# Patient Record
Sex: FEMALE | Race: WHITE | NOT HISPANIC OR LATINO | Employment: FULL TIME | URBAN - METROPOLITAN AREA
[De-identification: names, ages, dates, MRNs, and addresses within clinical notes are randomized per-mention and may not be internally consistent; named-entity substitution may affect disease eponyms.]

---

## 2023-10-27 PROBLEM — R31.9 HEMATURIA: Status: ACTIVE | Noted: 2023-10-27

## 2023-10-27 RX ORDER — ELECTROLYTES/DEXTROSE
SOLUTION, ORAL ORAL
COMMUNITY

## 2023-10-27 RX ORDER — DROSPIRENONE AND ETHINYL ESTRADIOL 0.02-3(28)
1 KIT ORAL DAILY
COMMUNITY
Start: 2020-03-31 | End: 2024-02-15

## 2023-10-27 RX ORDER — DICLOFENAC SODIUM 50 MG/1
TABLET, DELAYED RELEASE ORAL
COMMUNITY
End: 2024-01-11 | Stop reason: WASHOUT

## 2023-10-27 RX ORDER — MULTIVITAMIN/IRON/FOLIC ACID 18MG-0.4MG
TABLET ORAL
COMMUNITY

## 2023-10-30 ENCOUNTER — OFFICE VISIT (OUTPATIENT)
Dept: OBSTETRICS AND GYNECOLOGY | Facility: CLINIC | Age: 53
End: 2023-10-30
Payer: COMMERCIAL

## 2023-10-30 VITALS
BODY MASS INDEX: 22.4 KG/M2 | DIASTOLIC BLOOD PRESSURE: 80 MMHG | SYSTOLIC BLOOD PRESSURE: 128 MMHG | WEIGHT: 160 LBS | HEIGHT: 71 IN

## 2023-10-30 DIAGNOSIS — Z01.419 ENCOUNTER FOR GYNECOLOGICAL EXAMINATION WITHOUT ABNORMAL FINDING: ICD-10-CM

## 2023-10-30 DIAGNOSIS — Z01.419 WOMEN'S ANNUAL ROUTINE GYNECOLOGICAL EXAMINATION: Primary | ICD-10-CM

## 2023-10-30 PROCEDURE — 88175 CYTOPATH C/V AUTO FLUID REDO: CPT

## 2023-10-30 PROCEDURE — 99396 PREV VISIT EST AGE 40-64: CPT | Performed by: OBSTETRICS & GYNECOLOGY

## 2023-10-30 PROCEDURE — 87624 HPV HI-RISK TYP POOLED RSLT: CPT

## 2023-10-30 PROCEDURE — 1036F TOBACCO NON-USER: CPT | Performed by: OBSTETRICS & GYNECOLOGY

## 2023-10-30 NOTE — PROGRESS NOTES
Subjective   Paola Morales is a 52 y.o. female here for a routine exam. Current complaints: Has some breast pain on and off. Personal health questionnaire reviewed: yes.     Gynecologic History  No LMP recorded.  Contraception: post menopausal status  Mamm last year.    Obstetric History  OB History   No obstetric history on file.       Objective   Physical Exam  Chest:   Breasts:     Right: Normal.      Left: Normal.        Constitutional:       Appearance: Normal appearance.   HENT:      Head: Normocephalic and atraumatic.      Nose: Nose normal.   Cardiovascular:      Rate and Rhythm: Normal rate and regular rhythm.   Pulmonary:      Effort: Pulmonary effort is normal.   Abdominal:      Palpations: Abdomen is soft.   Genitourinary:     General: Normal vulva.      Exam position: Lithotomy position.      Anjel stage (genital): 5.      Labia:         Right: No rash, tenderness, lesion or injury.         Left: No rash, tenderness, lesion or injury.       Urethra: No prolapse, urethral pain, urethral swelling or urethral lesion.      Vagina: Normal.      Cervix: Normal.      Uterus: Normal.       Adnexa: Right adnexa normal and left adnexa normal.        Right: No mass, tenderness or fullness.          Left: No mass, tenderness or fullness.     Musculoskeletal:         General: Normal range of motion.      Cervical back: Normal range of motion.   Skin:     General: Skin is warm and dry.   Neurological:      General: No focal deficit present.      Mental Status: She is alert.   Psychiatric:         Mood and Affect: Mood normal.         Behavior: Behavior normal.       Assessment/Plan   Healthy female exam.    Mammogram ordered.  Follow up in: 1 years.  Pap done , Pt to stop birth control and call if needed.  Pt to call if needs HRT.  Pap done

## 2023-10-31 ENCOUNTER — TELEPHONE (OUTPATIENT)
Dept: OBSTETRICS AND GYNECOLOGY | Facility: CLINIC | Age: 53
End: 2023-10-31
Payer: COMMERCIAL

## 2023-10-31 DIAGNOSIS — Z12.39 ENCOUNTER FOR BREAST CANCER SCREENING OTHER THAN MAMMOGRAM: Primary | ICD-10-CM

## 2023-10-31 NOTE — TELEPHONE ENCOUNTER
Pt saw AW for her annual yesterday. She forgot to get her mammogram order. She needs a printed copy because she has it done at [facility]. Can she receive a copy of her order through the mail? Please advise. Thanks

## 2023-11-14 LAB
CYTOLOGY CMNT CVX/VAG CYTO-IMP: NORMAL
HPV HR 12 DNA GENITAL QL NAA+PROBE: NEGATIVE
HPV HR GENOTYPES PNL CVX NAA+PROBE: NEGATIVE
HPV16 DNA SPEC QL NAA+PROBE: NEGATIVE
HPV18 DNA SPEC QL NAA+PROBE: NEGATIVE
LAB AP HPV GENOTYPE QUESTION: YES
LAB AP HPV HR: NORMAL
LABORATORY COMMENT REPORT: NORMAL
PATH REPORT.TOTAL CANCER: NORMAL

## 2024-01-04 ENCOUNTER — APPOINTMENT (OUTPATIENT)
Dept: CARDIOLOGY | Facility: HOSPITAL | Age: 54
End: 2024-01-04
Payer: COMMERCIAL

## 2024-01-04 ENCOUNTER — HOSPITAL ENCOUNTER (EMERGENCY)
Facility: HOSPITAL | Age: 54
Discharge: HOME | End: 2024-01-04
Attending: EMERGENCY MEDICINE
Payer: COMMERCIAL

## 2024-01-04 ENCOUNTER — APPOINTMENT (OUTPATIENT)
Dept: RADIOLOGY | Facility: HOSPITAL | Age: 54
End: 2024-01-04
Payer: COMMERCIAL

## 2024-01-04 VITALS
RESPIRATION RATE: 16 BRPM | SYSTOLIC BLOOD PRESSURE: 155 MMHG | BODY MASS INDEX: 22.26 KG/M2 | TEMPERATURE: 97.2 F | OXYGEN SATURATION: 100 % | HEIGHT: 71 IN | WEIGHT: 159 LBS | HEART RATE: 70 BPM | DIASTOLIC BLOOD PRESSURE: 80 MMHG

## 2024-01-04 DIAGNOSIS — R42 VERTIGO: Primary | ICD-10-CM

## 2024-01-04 LAB
ALBUMIN SERPL BCP-MCNC: 4.1 G/DL (ref 3.4–5)
ALP SERPL-CCNC: 51 U/L (ref 33–110)
ALT SERPL W P-5'-P-CCNC: 15 U/L (ref 7–45)
ANION GAP SERPL CALC-SCNC: 12 MMOL/L (ref 10–20)
AST SERPL W P-5'-P-CCNC: 17 U/L (ref 9–39)
BASOPHILS # BLD AUTO: 0.04 X10*3/UL (ref 0–0.1)
BASOPHILS NFR BLD AUTO: 0.3 %
BILIRUB SERPL-MCNC: 0.4 MG/DL (ref 0–1.2)
BUN SERPL-MCNC: 14 MG/DL (ref 6–23)
CALCIUM SERPL-MCNC: 8.8 MG/DL (ref 8.6–10.3)
CHLORIDE SERPL-SCNC: 102 MMOL/L (ref 98–107)
CO2 SERPL-SCNC: 25 MMOL/L (ref 21–32)
CREAT SERPL-MCNC: 0.54 MG/DL (ref 0.5–1.05)
EOSINOPHIL # BLD AUTO: 0 X10*3/UL (ref 0–0.7)
EOSINOPHIL NFR BLD AUTO: 0 %
ERYTHROCYTE [DISTWIDTH] IN BLOOD BY AUTOMATED COUNT: 12 % (ref 11.5–14.5)
FLUAV RNA RESP QL NAA+PROBE: NOT DETECTED
FLUBV RNA RESP QL NAA+PROBE: NOT DETECTED
GFR SERPL CREATININE-BSD FRML MDRD: >90 ML/MIN/1.73M*2
GLUCOSE SERPL-MCNC: 106 MG/DL (ref 74–99)
HCT VFR BLD AUTO: 40 % (ref 36–46)
HGB BLD-MCNC: 13.3 G/DL (ref 12–16)
IMM GRANULOCYTES # BLD AUTO: 0.1 X10*3/UL (ref 0–0.7)
IMM GRANULOCYTES NFR BLD AUTO: 0.7 % (ref 0–0.9)
LYMPHOCYTES # BLD AUTO: 1.04 X10*3/UL (ref 1.2–4.8)
LYMPHOCYTES NFR BLD AUTO: 7.2 %
MAGNESIUM SERPL-MCNC: 1.82 MG/DL (ref 1.6–2.4)
MCH RBC QN AUTO: 31.8 PG (ref 26–34)
MCHC RBC AUTO-ENTMCNC: 33.3 G/DL (ref 32–36)
MCV RBC AUTO: 96 FL (ref 80–100)
MONOCYTES # BLD AUTO: 0.39 X10*3/UL (ref 0.1–1)
MONOCYTES NFR BLD AUTO: 2.7 %
NEUTROPHILS # BLD AUTO: 12.8 X10*3/UL (ref 1.2–7.7)
NEUTROPHILS NFR BLD AUTO: 89.1 %
NRBC BLD-RTO: 0 /100 WBCS (ref 0–0)
PLATELET # BLD AUTO: 303 X10*3/UL (ref 150–450)
POTASSIUM SERPL-SCNC: 3.7 MMOL/L (ref 3.5–5.3)
PROT SERPL-MCNC: 6.6 G/DL (ref 6.4–8.2)
RBC # BLD AUTO: 4.18 X10*6/UL (ref 4–5.2)
RSV RNA RESP QL NAA+PROBE: NOT DETECTED
SARS-COV-2 RNA RESP QL NAA+PROBE: NOT DETECTED
SODIUM SERPL-SCNC: 135 MMOL/L (ref 136–145)
WBC # BLD AUTO: 14.4 X10*3/UL (ref 4.4–11.3)

## 2024-01-04 PROCEDURE — 80053 COMPREHEN METABOLIC PANEL: CPT

## 2024-01-04 PROCEDURE — 2500000005 HC RX 250 GENERAL PHARMACY W/O HCPCS: Performed by: EMERGENCY MEDICINE

## 2024-01-04 PROCEDURE — 83735 ASSAY OF MAGNESIUM: CPT

## 2024-01-04 PROCEDURE — 36415 COLL VENOUS BLD VENIPUNCTURE: CPT

## 2024-01-04 PROCEDURE — 99283 EMERGENCY DEPT VISIT LOW MDM: CPT | Mod: 25 | Performed by: EMERGENCY MEDICINE

## 2024-01-04 PROCEDURE — 93005 ELECTROCARDIOGRAM TRACING: CPT

## 2024-01-04 PROCEDURE — 71046 X-RAY EXAM CHEST 2 VIEWS: CPT | Mod: FOREIGN READ | Performed by: RADIOLOGY

## 2024-01-04 PROCEDURE — 71046 X-RAY EXAM CHEST 2 VIEWS: CPT

## 2024-01-04 PROCEDURE — 99284 EMERGENCY DEPT VISIT MOD MDM: CPT | Performed by: EMERGENCY MEDICINE

## 2024-01-04 PROCEDURE — 85025 COMPLETE CBC W/AUTO DIFF WBC: CPT

## 2024-01-04 PROCEDURE — 87637 SARSCOV2&INF A&B&RSV AMP PRB: CPT

## 2024-01-04 RX ORDER — MECLIZINE HYDROCHLORIDE 25 MG/1
25 TABLET ORAL 3 TIMES DAILY PRN
Qty: 20 TABLET | Refills: 0 | Status: SHIPPED | OUTPATIENT
Start: 2024-01-04 | End: 2024-01-14

## 2024-01-04 RX ORDER — ONDANSETRON 4 MG/1
4 TABLET, FILM COATED ORAL EVERY 6 HOURS
Qty: 12 TABLET | Refills: 0 | Status: SHIPPED | OUTPATIENT
Start: 2024-01-04 | End: 2024-01-11

## 2024-01-04 RX ORDER — ONDANSETRON 4 MG/1
4 TABLET, ORALLY DISINTEGRATING ORAL ONCE
Status: COMPLETED | OUTPATIENT
Start: 2024-01-04 | End: 2024-01-04

## 2024-01-04 RX ADMIN — ONDANSETRON 4 MG: 4 TABLET, ORALLY DISINTEGRATING ORAL at 18:40

## 2024-01-04 ASSESSMENT — COLUMBIA-SUICIDE SEVERITY RATING SCALE - C-SSRS
2. HAVE YOU ACTUALLY HAD ANY THOUGHTS OF KILLING YOURSELF?: NO
6. HAVE YOU EVER DONE ANYTHING, STARTED TO DO ANYTHING, OR PREPARED TO DO ANYTHING TO END YOUR LIFE?: NO
1. IN THE PAST MONTH, HAVE YOU WISHED YOU WERE DEAD OR WISHED YOU COULD GO TO SLEEP AND NOT WAKE UP?: NO

## 2024-01-04 ASSESSMENT — PAIN SCALES - GENERAL: PAINLEVEL_OUTOF10: 0 - NO PAIN

## 2024-01-04 ASSESSMENT — PAIN - FUNCTIONAL ASSESSMENT: PAIN_FUNCTIONAL_ASSESSMENT: 0-10

## 2024-01-04 NOTE — ED PROVIDER NOTES
HPI   Chief Complaint   Patient presents with    Nausea     Vomiting, dizziness, started today        Patient is a 53-year-old female, medical history only significant for osteoarthritis, presents to the emergency department after dizziness with nausea and vomiting.  Patient states she woke in her normal state of health.  She states she went to work.  She states she began to feel dizzy that she describes sensation of motion.  She states it was hard to walk and hold her head up because of the dizziness.  She states any movement would make the dizziness worse.  She states she had a coworker helped to the bathroom.  She had episode of emesis.  She was still feeling dizzy.  She had no chest pain or shortness of breath.  She denies any fever or chills.  She states that she actually came in by EMS.  She did have to wait for room.  Since that time, her symptoms have almost totally abated.  Her only current symptom is of mild nausea.  She states up until today, she has been in normal state of health.  She does admit to increased stressors as her mother recently passed away and there is been more stress at work.  She denies any history of coronary vascular disease.                          [place] Coma Scale Score: 15                  Patient History   No past medical history on file.  No past surgical history on file.  No family history on file.  Social History     Tobacco Use    Smoking status: Never    Smokeless tobacco: Never   Substance Use Topics    Alcohol use: Not on file    Drug use: Not on file       Physical Exam   ED Triage Vitals [01/04/24 1152]   Temp Heart Rate Resp BP   36.2 °C (97.2 °F) 68 16 161/70      SpO2 Temp src Heart Rate Source Patient Position   100 % -- -- --      BP Location FiO2 (%)     -- --       Physical Exam  Vitals and nursing note reviewed.   Constitutional:       General: She is not in acute distress.     Appearance: She is well-developed.   HENT:      Head: Normocephalic and atraumatic.    Eyes:      Conjunctiva/sclera: Conjunctivae normal.   Cardiovascular:      Rate and Rhythm: Normal rate and regular rhythm.      Heart sounds: No murmur heard.  Pulmonary:      Effort: Pulmonary effort is normal. No respiratory distress.      Breath sounds: Normal breath sounds.   Abdominal:      Palpations: Abdomen is soft.      Tenderness: There is no abdominal tenderness.   Musculoskeletal:         General: No swelling.      Cervical back: Neck supple.   Skin:     General: Skin is warm and dry.      Capillary Refill: Capillary refill takes less than 2 seconds.   Neurological:      General: No focal deficit present.      Mental Status: She is alert and oriented to person, place, and time.      Cranial Nerves: No cranial nerve deficit.      Motor: No weakness.      Gait: Gait normal.   Psychiatric:         Mood and Affect: Mood normal.         ED Course & MDM   Diagnoses as of 01/04/24 2568   Vertigo       Medical Decision Making  Medical Decision Making: Patient presents to the emergency department with episode of dizziness that she describes a sensation of motion.  It is since resolved.  She has no difficulty with rapid alternating movements.  She has no change in gait.  Patient states that she is feeling improved just with mild nausea.  My suspicion is is likely episode of vertigo.  Patient was seen initially in triage.  Metabolic workup was pursued.  She does have a mild leukocytosis which I feel is likely reactive.  Rest of patient's labs are unremarkable.  Patient was given oral Zofran.  She is able to ambulate without issue.    EKG interpreted by myself (ED attending physician): Sinus rhythm.  Rate of 77.  Normal axis intervals.  No acute ischemia.  No STEMI.    Differential Diagnoses Considered: Vertigo, UTI, infection    Chronic Medical Conditions Significantly Affecting Care: No significant medical history    External Records Reviewed: I reviewed recent and relevant outside records including: No recent  visits    Independent Interpretation of Studies:  I independently interpreted: Chest x-ray demonstrates normal cardiac silhouette, no infiltrates, no effusions    Escalation of Care:  Appropriate for outpatient management    Social Determinants of Health Significantly Affecting Care:  No social determinants    Prescription Drug Consideration: Antiemetics    Diagnostic testing considered: Noncontributory            Procedure  Procedures     Babatunde Hung MD  01/04/24 9810

## 2024-01-04 NOTE — ED TRIAGE NOTES
The patient was seen and examined in triage.     History of Present Illness: The patient is a 53-year-old female with a past medical history of arthritis presenting to the emergency department with nausea, vomiting, and lightheadedness since 9:30 AM this morning.  Patient was feeling fine this morning when she woke up, until she went to work and said that she felt a sudden onset of lightheadedness.  Denies syncope.  Patient states that she did have one episode of nonbloody nonbilious emesis at work and that her coworker had to accompany her to the restroom.  Denies any similar episodes in the past.  No history of ACS.  Denies chest pain, shortness of breath, abdominal pain, diarrhea, constipation, urinary symptoms, fever.  When seen in triage, patient states that she feels like her normal self again besides mild nausea.  She has tolerated eating and drinking today.    Brief Physical Exam: Exam is limited by the patient sitting in a chair in triage.  Heart: Regular rate and rhythm.   Lungs: Clear to auscultation bilaterally.   Abdomen: Soft, nondistended, normoactive bowel sounds, nontender  Neuro: A&O x 4. Sensation intact in all four extremities. No weakness. Normal gait.     Plan: Appropriate labs and diagnostic imaging were ordered.     For the remainder of the patient's workup and ED course, please refer to the main ED provider note. We discussed need for diagnostic testing including laboratory studies and imaging.  We also discussed that they may be asked to wait in the waiting room while these tests are pending.  They understand that if they choose to leave without having the testing completed or resulted that we cannot rule out acute life threatening illnesses and the risks involved could lead to worsening condition, permanent disability or even death.      Disclaimer: This note was dictated by speech recognition. Minor errors in transcription may be present. Please call if questions.

## 2024-01-05 LAB
ATRIAL RATE: 77 BPM
P AXIS: 67 DEGREES
P OFFSET: 193 MS
P ONSET: 137 MS
PR INTERVAL: 162 MS
Q ONSET: 218 MS
QRS COUNT: 13 BEATS
QRS DURATION: 78 MS
QT INTERVAL: 374 MS
QTC CALCULATION(BAZETT): 423 MS
QTC FREDERICIA: 406 MS
R AXIS: 34 DEGREES
T AXIS: 35 DEGREES
T OFFSET: 405 MS
VENTRICULAR RATE: 77 BPM

## 2024-01-11 ENCOUNTER — OFFICE VISIT (OUTPATIENT)
Dept: PRIMARY CARE | Facility: CLINIC | Age: 54
End: 2024-01-11
Payer: COMMERCIAL

## 2024-01-11 VITALS
HEART RATE: 76 BPM | TEMPERATURE: 97 F | BODY MASS INDEX: 22.68 KG/M2 | HEIGHT: 71 IN | WEIGHT: 162 LBS | DIASTOLIC BLOOD PRESSURE: 85 MMHG | SYSTOLIC BLOOD PRESSURE: 136 MMHG

## 2024-01-11 DIAGNOSIS — Z13.89 OBESITY SCREEN: ICD-10-CM

## 2024-01-11 DIAGNOSIS — R42 DIZZINESS: ICD-10-CM

## 2024-01-11 DIAGNOSIS — D72.829 LEUKOCYTOSIS, UNSPECIFIED TYPE: ICD-10-CM

## 2024-01-11 DIAGNOSIS — R03.0 ELEVATED BLOOD PRESSURE READING: ICD-10-CM

## 2024-01-11 DIAGNOSIS — Z76.89 ENCOUNTER TO ESTABLISH CARE WITH NEW DOCTOR: Primary | ICD-10-CM

## 2024-01-11 DIAGNOSIS — Z87.898 HISTORY OF CHEST PAIN: ICD-10-CM

## 2024-01-11 DIAGNOSIS — Z87.898 HISTORY OF NIGHT SWEATS: ICD-10-CM

## 2024-01-11 DIAGNOSIS — M15.9 OSTEOARTHRITIS OF MULTIPLE JOINTS, UNSPECIFIED OSTEOARTHRITIS TYPE: ICD-10-CM

## 2024-01-11 DIAGNOSIS — E87.1 HYPONATREMIA: ICD-10-CM

## 2024-01-11 DIAGNOSIS — Z78.9 NON-SMOKER: ICD-10-CM

## 2024-01-11 DIAGNOSIS — Z71.85 VACCINE COUNSELING: ICD-10-CM

## 2024-01-11 DIAGNOSIS — Z13.31 DEPRESSION SCREENING: ICD-10-CM

## 2024-01-11 DIAGNOSIS — F43.9 STRESS: ICD-10-CM

## 2024-01-11 DIAGNOSIS — Z86.79 HISTORY OF ABNORMAL ELECTROCARDIOGRAM: ICD-10-CM

## 2024-01-11 DIAGNOSIS — H93.8X9 EAR CONGESTION, UNSPECIFIED LATERALITY: ICD-10-CM

## 2024-01-11 DIAGNOSIS — Z28.20 VACCINE REFUSED BY PATIENT: ICD-10-CM

## 2024-01-11 PROCEDURE — 1036F TOBACCO NON-USER: CPT | Performed by: INTERNAL MEDICINE

## 2024-01-11 PROCEDURE — 99205 OFFICE O/P NEW HI 60 MIN: CPT | Performed by: INTERNAL MEDICINE

## 2024-01-11 PROCEDURE — 3008F BODY MASS INDEX DOCD: CPT | Performed by: INTERNAL MEDICINE

## 2024-01-11 RX ORDER — BISMUTH SUBSALICYLATE 262 MG
1 TABLET,CHEWABLE ORAL DAILY
COMMUNITY

## 2024-01-11 ASSESSMENT — PATIENT HEALTH QUESTIONNAIRE - PHQ9
SUM OF ALL RESPONSES TO PHQ9 QUESTIONS 1 AND 2: 0
1. LITTLE INTEREST OR PLEASURE IN DOING THINGS: NOT AT ALL
2. FEELING DOWN, DEPRESSED OR HOPELESS: NOT AT ALL

## 2024-01-11 NOTE — PROGRESS NOTES
Subjective   Patient ID: Paola Morales is a 53 y.o. female who presents for Follow-up ([facility] ER follow up DX:Vertigo).    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 Pt reports b/l ear discomfort for over a month. Pt states that she has the episode of severe dizziness with nausea and vomiting on 1/4/2024. Pt states that she has  Pt was brought to the ED of [facility], had BW done as well as ECG and was discharge on Meclizine and Zofran for the sxs management.  Pt was asked to see PCP for the further evaluation. Pt also states that along with the dizziness she had the episode of the left sided chest pain at that day. Pt rates this pain as 5/10 with some radiation along the left ribs line. Pt states that this episode was very brief and resolved by itself. During the clinical encounter pt denies fever, chills, no SOB, no PNDs/orthopnea, no weakness/numbness/tingling sensation, no fainting, no LOC, no abdominal pain, no recent  N/V/D reported, no change of urination reported. Pt denies any other health concerns during this visit, but states that she has a lot of stress recently and had the episode of night sweats recently.   Sohx: pt does not smoke/drink alcohol/use illegal drugs  Fhx: no Fhx of colon/breast CA, no Fhx of heart issue.    Review of Systems  Constitutional: no fever, no chills  Eyes: no eyesight problems, no eye redness, no eye pain, no blurred vision  ENT: no ear pain or sore throat, no nasal discharge or congestion, no sinus pressure, no hearing loss, but as mentioned  Cardiovascular: no SOB, the heart rate was not fast or slow, but as mentioned   Respiratory: no cough, no dyspnea with exertion or with rest, no wheezing  Gastrointestinal: no abdominal pain, no constipation or diarrhea, no heartburn, no nausea or vomiting  Genitourinary: no urine frequency, no dysuria, no urinary  "urgency, no urinary incontinence or retention  Musculoskeletal: no back pain, no arthralgia, no joint swelling or stiffness, no muscle weakness  Integumentary: no skin lesions or rash  Neurological: no headaches, no dizziness or fainting, no limb weakness  Psychiatric: no mood changes, no anxiety or depression, no suicidal thoughts  Endocrine: no weight change, no temperature intolerance, no change of appetite  Hematologic/Lymphatic: no easy bruising or bleeding    Objective   /85 (BP Location: Right arm, Patient Position: Sitting)   Pulse 76   Temp 36.1 °C (97 °F)   Ht 1.803 m (5' 11\")   Wt 73.5 kg (162 lb)   BMI 22.59 kg/m²     Physical Exam  Constitutional: well hydrated, well nourished, no acute distress. Vital signs reviewed  Head and face: normocephalic, atraumatic  Eyes: no erythema, edema or visible abnormalities of conjunctiva or lids. Pupils are equal, round and reactive to light. Extraocular movement normal  ENT: external ears without deformities, external ear canals without redness, swelling or tenderness. TMs normal color, normal landmarks, no fluid, non-retracted  Neck: full ROM, no adenopathy, neck was supple, thyroid gland not enlarged, no palpable nodules  Pulmonary: normal respiratory rate and effort. Lungs are clear to auscultation, no rales,no rhonchi or wheezing  Cardiovascular: regular rate and rhythm, normal S1 and S2, no murmur, no gallop, posterior tib. pulse normal 2+ bilaterally, no lower extremity edema  Abdomen: soft, non tender on palpation, not distended, normal BS in all 4 quadrants, no CVA tenderness  Musculoskeletal: no joint swelling, normal movements of all 4 extremities. Gait and station: normal, Digits and nails: normal without clubbing  Skin: normal color, no rash  Neurologic: Cranial nerves: CN II: visual acuity intact. Source: acuity reported by patient. Pupils reactive to light with normal accommodation. Right and left pupil normal CN III, IV and VI: the EO " movements were intact. CN VIII: no hearing loss. Deep tendon reflexes: were 2+ and symmetric: R and L triceps, R and L brachioradialis, R and L patella.  Sensory exam: normal light to touch  Psychiatric: Mood and affect: normal, Alert and Oriented x 3  Lymphatic: no cervical lymphadenopathy  Assessment/Plan   New pt to establish:  Hx and PE as mentioned  Obesity screening: BMI WNL  MDD screening: PHQ2 score is 0  Pt is UTD with the screening colonoscopy, mammogram, PAP smear  Pt was counseled about the age appropriate vaccination. PT declined flu vaccine  Pt will  need AWV    Dizziness, ear congestion:   Hx and PE as mentioned  The recent ED visit record was reviewed  Pt will see ENT team for the further evaluation and Tx  Please continue on Meclizine as prescribed for the sxs    Chest pain:  Hx and PE as mentioned  BP readings were mildly elevated and we will continue to monitor BP readings closely  ECG was done during the ED visit and showed non specific T wave changes  Will order CBC, CMP, TSH/T4, lipid panel  Will order ECHO study  Will order the evaluation with the cardiology team  Pt should  to call 911/ go to ER if will have SOB/Chest pain/tightness/drowsiness/LOC or fainting  Plan was d/c with the pt in details, verbalized understanding, agreed  Hx of the night sweats: will check vitamin D level  Hyponatremia: will recheck CMP  Elevated WBC: will recheck CBC  Please follow up with PCP as planned or sooner if needed

## 2024-01-15 ENCOUNTER — HOSPITAL ENCOUNTER (OUTPATIENT)
Dept: CARDIOLOGY | Facility: CLINIC | Age: 54
Discharge: HOME | End: 2024-01-15
Payer: COMMERCIAL

## 2024-01-15 VITALS
HEIGHT: 71 IN | DIASTOLIC BLOOD PRESSURE: 80 MMHG | SYSTOLIC BLOOD PRESSURE: 142 MMHG | BODY MASS INDEX: 22.68 KG/M2 | WEIGHT: 162 LBS

## 2024-01-15 DIAGNOSIS — Z86.79 HISTORY OF ABNORMAL ELECTROCARDIOGRAM: ICD-10-CM

## 2024-01-15 DIAGNOSIS — Z87.898 HISTORY OF CHEST PAIN: ICD-10-CM

## 2024-01-15 PROCEDURE — 93308 TTE F-UP OR LMTD: CPT | Performed by: INTERNAL MEDICINE

## 2024-01-15 PROCEDURE — 93308 TTE F-UP OR LMTD: CPT

## 2024-01-16 ENCOUNTER — TELEPHONE (OUTPATIENT)
Dept: PRIMARY CARE | Facility: CLINIC | Age: 54
End: 2024-01-16
Payer: COMMERCIAL

## 2024-01-16 LAB
AORTIC VALVE MEAN GRADIENT: 5
AORTIC VALVE PEAK VELOCITY: 1.45
AV PEAK GRADIENT: 8.4
AVA (PEAK VEL): 2.05
AVA (VTI): 2.05
EJECTION FRACTION APICAL 4 CHAMBER: 67.7
LEFT VENTRICLE INTERNAL DIMENSION DIASTOLE: 4.78 (ref 3.5–6)
LEFT VENTRICULAR OUTFLOW TRACT DIAMETER: 1.9
MITRAL VALVE E/A RATIO: 1.12
MITRAL VALVE E/E' RATIO: 8

## 2024-01-16 NOTE — TELEPHONE ENCOUNTER
I would recommend the pt to see the cardiology team since she had the hx of chest pian. Pt also should FU with PCP for the regular routine OV in 3 months or sooner if needed.  Thank you

## 2024-01-16 NOTE — TELEPHONE ENCOUNTER
----- Message from Tavia Ayala MD sent at 1/16/2024  3:45 PM EST -----  The last ECHO test was normal.

## 2024-01-16 NOTE — TELEPHONE ENCOUNTER
Telephone call to patient, spoke with patient, informed patient of normal ECHO. Patient would like to know if she still needs to keep her appointment with cardiology on 01/25.

## 2024-01-19 NOTE — TELEPHONE ENCOUNTER
Patient scheduled for 3 month FUV 4/16 and she confirmed that she will keep her cardiology appt on 1/25. Faxed lab orders that were ordered on 1/11 to [facility] in [place] per patient request

## 2024-01-19 NOTE — TELEPHONE ENCOUNTER
Telephone call to patient, left voicemail informing patient to keep her 01/25 appointment with cardiology. She should also follow up with PCP in 3 months.

## 2024-01-25 ENCOUNTER — APPOINTMENT (OUTPATIENT)
Dept: CARDIOLOGY | Facility: CLINIC | Age: 54
End: 2024-01-25
Payer: COMMERCIAL

## 2024-02-02 ENCOUNTER — OFFICE VISIT (OUTPATIENT)
Dept: CARDIOLOGY | Facility: CLINIC | Age: 54
End: 2024-02-02
Payer: COMMERCIAL

## 2024-02-02 VITALS
DIASTOLIC BLOOD PRESSURE: 82 MMHG | HEART RATE: 71 BPM | HEIGHT: 71 IN | BODY MASS INDEX: 21.98 KG/M2 | WEIGHT: 157 LBS | TEMPERATURE: 97.2 F | SYSTOLIC BLOOD PRESSURE: 132 MMHG

## 2024-02-02 DIAGNOSIS — Z78.9 NEVER SMOKED ANY SUBSTANCE: ICD-10-CM

## 2024-02-02 DIAGNOSIS — R07.89 ATYPICAL CHEST PAIN: ICD-10-CM

## 2024-02-02 DIAGNOSIS — R03.0 ELEVATED BLOOD PRESSURE READING WITHOUT DIAGNOSIS OF HYPERTENSION: ICD-10-CM

## 2024-02-02 DIAGNOSIS — R42 VERTIGO: Primary | ICD-10-CM

## 2024-02-02 DIAGNOSIS — Z87.898 HISTORY OF CHEST PAIN: ICD-10-CM

## 2024-02-02 DIAGNOSIS — Z86.79 HISTORY OF ABNORMAL ELECTROCARDIOGRAM: ICD-10-CM

## 2024-02-02 PROCEDURE — 3008F BODY MASS INDEX DOCD: CPT | Performed by: INTERNAL MEDICINE

## 2024-02-02 PROCEDURE — 1036F TOBACCO NON-USER: CPT | Performed by: INTERNAL MEDICINE

## 2024-02-02 PROCEDURE — 99204 OFFICE O/P NEW MOD 45 MIN: CPT | Performed by: INTERNAL MEDICINE

## 2024-02-02 ASSESSMENT — ENCOUNTER SYMPTOMS
GASTROINTESTINAL NEGATIVE: 1
ENDOCRINE NEGATIVE: 1
BACK PAIN: 1
EYES NEGATIVE: 1
NERVOUS/ANXIOUS: 1
DIZZINESS: 1
HEMATOLOGIC/LYMPHATIC NEGATIVE: 1
PALPITATIONS: 1
ARTHRALGIAS: 1
RESPIRATORY NEGATIVE: 1
LIGHT-HEADEDNESS: 0
ALLERGIC/IMMUNOLOGIC NEGATIVE: 1
CONSTITUTIONAL NEGATIVE: 1

## 2024-02-02 NOTE — PROGRESS NOTES
"CARDIOLOGY NEW PATIENT OFFICE VISIT    Patient:  Paola Morales  YOB: 1970  MRN: [id number]       Chief Complaint/Active Symptoms:       Paola Morales is a 53 y.o. female who is being seen today at the request of DR. Ayala for evaluation of chest pain and dizziness.    Chief Complaint   Patient presents with    Establish Care     Patient was in the ER on 1/4/24 for severe dizziness.        History of Present Illness:   HPI    Patient here for episode of dizziness with nausea January 4th. Was sitting at her desk and suddenly the room started spinning and she had associated nausea.     Earlier in the morning she had some mild vague chest discomfort that she associates with periods of anxiety. Felt \"tight\" as though she hadn't stretched properly. Lasted about 20 minutes with no associated symptoms. Gets this every several weeks and mostly with anxiety but never with on exertion. Has no problems with physical exercise. No shortness or breath, orthopnea or PND. No edema. Has had episodes of her heart racing when she is anxious - ususally at work when worried about missing a deadline. No syncope.     Only chronic health problem is arthritis - predominantly, knees, feet, low back.   Allergies:     Allergies   Allergen Reactions    Codeine Constipation    Sulfa (Sulfonamide Antibiotics) Unknown        Outpatient Medications:     Current Outpatient Medications   Medication Instructions    biotin 5 mg capsule oral    drospirenone-ethinyl estradioL (Joby Stevens) 3-0.02 mg tablet 1 tablet, oral, Daily    glucosamine/chondr dominguez A sod (glucosamine-chondroitin) 1,500-1,200 mg/30 mL liquid oral    multivitamin tablet 1 tablet, oral, Daily    white petrolatum-mineral oiL (Tears Naturale PM) 94-3 % ophthalmic ointment 1 Application, Both Eyes        Past Medical History:     History reviewed. No pertinent past medical history.    Social History:     Social History     Socioeconomic History    Marital status: " Single     Spouse name: None    Number of children: None    Years of education: None    Highest education level: None   Occupational History    None   Tobacco Use    Smoking status: Never    Smokeless tobacco: Never   Substance and Sexual Activity    Alcohol use: Yes     Alcohol/week: 2.0 standard drinks of alcohol     Types: 2 Standard drinks or equivalent per week    Drug use: Not Currently    Sexual activity: None   Other Topics Concern    None   Social History Narrative    None     Social Determinants of Health     Financial Resource Strain: Not on file   Food Insecurity: Not on file   Transportation Needs: Not on file   Physical Activity: Not on file   Stress: Not on file   Social Connections: Not on file   Intimate Partner Violence: Not on file   Housing Stability: Not on file       Family History:      No family history on file.    Review of Systems:     Review of Systems   Constitutional: Negative.    HENT: Negative.     Eyes: Negative.    Respiratory: Negative.     Cardiovascular:  Positive for chest pain and palpitations. Negative for leg swelling.   Gastrointestinal: Negative.    Endocrine: Negative.    Genitourinary: Negative.    Musculoskeletal:  Positive for arthralgias and back pain.   Skin: Negative.    Allergic/Immunologic: Negative.    Neurological:  Positive for dizziness. Negative for syncope and light-headedness.   Hematological: Negative.    Psychiatric/Behavioral:  The patient is nervous/anxious.    All other systems reviewed and are negative.      Objective:     Vitals:    02/02/24 0808   BP: 132/82   Pulse: 71   Temp: 36.2 °C (97.2 °F)       Vitals:    02/02/24 0808   Weight: 71.2 kg (157 lb)       Physical Examination:   GENERAL:  Well developed, well nourished, in no acute distress.  HEENT: NC AT EOMI with anicteric sclera  NECK:  Supple, no JVD, no bruit.  CHEST:  Symmetric and nontender.  LUNGS:  Normal respiratory effort. Bilateral breath sounds clear to auscultation.   HEART:  PMI  "nondisplaced. Rate and rhythm regular with no evident murmur, no gallop appreciated. There are no rubs, clicks or heaves.  EKG from 1/4/2024 shows normal sinus rhythm with borderline R wave progression.  PERIPHERAL VASCULAR:  Pulses present and equally palpable; 2+ throughout.  ABDOMEN: Soft, NT, ND without HSM or palpable organomegaly, no bruits, normoactive bowel sounds  MUSCULOSKELETAL: No significant joint or limb deformity  EXTREMITIES:  Warm with good color, no clubbing or cyanosis.  There is no edema noted.  NEURO/PSYCH:  Alert and oriented times three with approppriate behavior and responses.  LYMPH: no significant palpable lymphadenopathy  SKIN: No rashes on exposed skin, no reported skin lesions.     Lab:     CBC:   Lab Results   Component Value Date    WBC 14.4 (H) 01/04/2024    RBC 4.18 01/04/2024    HGB 13.3 01/04/2024    HCT 40.0 01/04/2024     01/04/2024      Lab Results   Component Value Date    WBC 14.4 (H) 01/04/2024    RBC 4.18 01/04/2024    HGB 13.3 01/04/2024    HCT 40.0 01/04/2024    MCV 96 01/04/2024    MCH 31.8 01/04/2024    MCHC 33.3 01/04/2024    RDW 12.0 01/04/2024     01/04/2024       CMP:    Lab Results   Component Value Date     (L) 01/04/2024    K 3.7 01/04/2024     01/04/2024    CO2 25 01/04/2024    BUN 14 01/04/2024    CREATININE 0.54 01/04/2024    GLUCOSE 106 (H) 01/04/2024    CALCIUM 8.8 01/04/2024     Lab Results   Component Value Date     (L) 01/04/2024    K 3.7 01/04/2024     01/04/2024    CO2 25 01/04/2024    BUN 14 01/04/2024    CREATININE 0.54 01/04/2024     Lab Results   Component Value Date    ALKPHOS 51 01/04/2024    ALT 15 01/04/2024    AST 17 01/04/2024    PROT 6.6 01/04/2024    BILITOT 0.4 01/04/2024       Magnesium:    Lab Results   Component Value Date    MG 1.82 01/04/2024       Lipid Profile:    No results found for: \"CHLPL\", \"TRIG\", \"HDL\", \"LDLCALC\", \"LDLDIRECT\"    TSH:    No results found for: \"TSH\"    BNP:   No results found " "for: \"BNP\"     PT/INR:    No results found for: \"PROTIME\", \"INR\"    HgBA1c:    No results found for: \"HGBA1C\"    Cardiac Enzymes:    No results found for: \"TROPHS\"      Diagnostic Studies:     Transthoracic Echo (TTE) Complete    Result Date: 1/16/2024                  [facility] [id number], [address], [place] [address]            Tel [phone] Fax [phone] TRANSTHORACIC ECHOCARDIOGRAM REPORT  Patient Name:      VIRGIL RODRIGUEZBETINA       Reading Physician:    [id number] Lilian Mendez MD Study Date:        1/15/2024             Ordering Provider:    [id number] XU CROOKS MRN/PID:           [id number]              Fellow: Accession#:        [id number]          Nurse: Date of Birth/Age: 1970 / 53 years Sonographer:          MARCEL Beck RDMS, RVS Gender:            F                     Additional Staff: Height:            180.34 cm             Admit Date: Weight:            73.48 kg              Admission Status: BSA:               1.93 m2               Department Location: Blood Pressure: 142 /80 mmHg Study Type:    TRANSTHORACIC ECHO (TTE) COMPLETE Diagnosis/ICD: Personal history of other specified condition-Z87.898 Indication:    H/o ABN EKG/Chest pain CPT Codes:     Echo Complete w Full Doppler-[id number]  Study Detail: The following Echo studies were performed: 2D, M-Mode, Doppler and               color flow.  PHYSICIAN INTERPRETATION: Left Ventricle: Left ventricular systolic function is normal, with an estimated ejection fraction of 60-65%. There are no regional wall motion abnormalities. The left ventricular cavity size is normal. There is no evidence of left ventricular hypertrophy. Spectral Doppler shows a normal pattern of left ventricular diastolic filling. There is no definite " left ventricular thrombus visualized. The intraventricular septum appears intact without evidence of shunting or a ventricular septal defect. Left Atrium: The left atrium is normal in size. There is no atrial septal defect present. Right Ventricle: The right ventricle is normal in size. There is normal right ventricular global systolic function. Right Atrium: The right atrium is normal in size. Aortic Valve: The aortic valve is trileaflet. There is no evidence of aortic valve stenosis. The aortic valve dimensionless index is 0.72. There is no evidence of aortic valve regurgitation. The peak instantaneous gradient of the aortic valve is 8.4 mmHg. The mean gradient of the aortic valve is 5.0 mmHg. Mitral Valve: The mitral valve is mildly thickened. There is no evidence of mitral valve prolapse. There is no evidence of mitral valve stenosis. There is no evidence of mitral annular calcification. There is trace mitral valve regurgitation. Tricuspid Valve: The tricuspid valve is structurally normal. There is no evidence of tricuspid valve stenosis. There is trace tricuspid regurgitation. The right ventricular systolic pressure is unable to be estimated. Pulmonic Valve: The pulmonic valve is not well visualized. There is trace pulmonic valve regurgitation. Pericardium: There is no pericardial effusion noted. Aorta: The aortic root is normal. Systemic Veins: The inferior vena cava appears to be of normal size. There is IVC inspiratory collapse greater than 50%. In comparison to the previous echocardiogram(s): There are no prior studies on this patient for comparison purposes.  CONCLUSIONS:  1. Left ventricular systolic function is normal with a 60-65% estimated ejection fraction.  2. Intact intraventricular septum without shunting or a ventricular septal defect.  3. No left ventricular thrombus visualized.  4. There is no evidence of left ventricular hypertrophy.  5. No evidence of mitral valve prolapse.  6. There is No  tricuspid stenosis.  7. Aortic valve stenosis is not present. QUANTITATIVE DATA SUMMARY: 2D MEASUREMENTS:                          Normal Ranges: Ao Root d:     3.40 cm   (2.0-3.7cm) LAs:           3.40 cm   (2.7-4.0cm) RVIDd:         1.92 cm   (0.9-3.6cm) IVSd:          1.05 cm   (0.6-1.1cm) LVPWd:         0.95 cm   (0.6-1.1cm) LVIDd:         4.78 cm   (3.9-5.9cm) LVIDs:         2.98 cm LV Mass Index: 87.6 g/m2 LV % FS        37.7 % AORTA MEASUREMENTS:                    Normal Ranges: Asc Ao, d: 2.80 cm (2.1-3.4cm) LV SYSTOLIC FUNCTION BY 2D PLANIMETRY (MOD):                     Normal Ranges: EF-A4C View: 67.7 % (>=55%) LV DIASTOLIC FUNCTION:                        Normal Ranges: MV Peak E:    0.90 m/s (0.7-1.2 m/s) MV Peak A:    0.80 m/s (0.42-0.7 m/s) E/A Ratio:    1.12     (1.0-2.2) MV lateral e' 0.11 m/s MV medial e'  0.12 m/s E/e' Ratio:   8.00     (<8.0) MITRAL INSUFFICIENCY:                      Normal Ranges: MR Vmax: 335.00 cm/s AORTIC VALVE:                                   Normal Ranges: AoV Vmax:                1.45 m/s (<=1.7m/s) AoV Peak P.4 mmHg (<20mmHg) AoV Mean P.0 mmHg (1.7-11.5mmHg) LVOT Max Rob:            1.05 m/s (<=1.1m/s) AoV VTI:                 35.20 cm (18-25cm) LVOT VTI:                25.50 cm LVOT Diameter:           1.90 cm  (1.8-2.4cm) AoV Area, VTI:           2.05 cm2 (2.5-5.5cm2) AoV Area,Vmax:           2.05 cm2 (2.5-4.5cm2) AoV Dimensionless Index: 0.72 PULMONIC VALVE:                      Normal Ranges: PV Max Rob: 0.9 m/s  (0.6-0.9m/s) PV Max PG:  3.5 mmHg  [id number] Lilian Mendez MD Electronically signed on 2024 at 2:44:00 PM  ** Final **      No nuclear medicine results found for the past 12 months    No valid procedures specified.    Radiology:     No valid procedures specified.    Assessment:     Problem List Items Addressed This Visit       Elevated blood pressure reading without diagnosis of hypertension    History of abnormal  electrocardiogram    Vertigo - Primary    Never smoked any substance    Atypical chest pain    Relevant Orders    CT cardiac scoring wo IV contrast     Other Visit Diagnoses       History of chest pain                Plan:     1.  Vertigo.  Her symptoms are consistent with vertigo and not suggestive of any cardiac arrhythmia.  No further cardiovascular testing for this is necessary at this time.  Her palpitations are benign by description when she has periods of anxiety.  2.  Atypical chest pain.  No recurrence and not suggestive of angina pectoris.  Although she has an abnormal EKG his findings are not consistent with a previous myocardial infarction.  Would recommend a CT cardiac score if this is low risk no additional testing is needed.  If CT cardiac score is greater than 100 would consider starting statin therapy.  3.  Elevated blood-pressure reading without diagnosis of hypertension.  Repeat assessment of her blood pressure shows normal blood pressure control.  4.  Tobacco and weight status.  The patient is a lifelong non-smoker and is at her ideal body weight.    Will have the patient return for cardiovascular testing if her CT cardiac score is abnormal otherwise can follow-up with cardiology on an as-needed basis.  If she develops recurrent vertigo she should be referred to vestibular therapy.

## 2024-02-09 ENCOUNTER — TELEPHONE (OUTPATIENT)
Dept: OBSTETRICS AND GYNECOLOGY | Facility: CLINIC | Age: 54
End: 2024-02-09

## 2024-02-09 NOTE — TELEPHONE ENCOUNTER
Pt would like to cancel her Rx. She said it's the only med on file and she wants to make you aware so you don't call in any refills.

## 2024-02-15 NOTE — PROGRESS NOTES
Pt sent message to take her hormones off her list of meds.  Pt to call if this is not right.  She was on Hilda/Gianvi

## 2024-02-17 ENCOUNTER — HOSPITAL ENCOUNTER (OUTPATIENT)
Dept: RADIOLOGY | Facility: HOSPITAL | Age: 54
Discharge: HOME | End: 2024-02-17
Payer: COMMERCIAL

## 2024-02-17 DIAGNOSIS — R07.89 ATYPICAL CHEST PAIN: ICD-10-CM

## 2024-02-17 PROCEDURE — 75571 CT HRT W/O DYE W/CA TEST: CPT

## 2024-04-16 ENCOUNTER — OFFICE VISIT (OUTPATIENT)
Dept: PRIMARY CARE | Facility: CLINIC | Age: 54
End: 2024-04-16
Payer: COMMERCIAL

## 2024-04-16 VITALS
HEIGHT: 71 IN | TEMPERATURE: 98.1 F | WEIGHT: 164 LBS | HEART RATE: 76 BPM | BODY MASS INDEX: 22.96 KG/M2 | SYSTOLIC BLOOD PRESSURE: 137 MMHG | DIASTOLIC BLOOD PRESSURE: 81 MMHG

## 2024-04-16 DIAGNOSIS — Z09 FOLLOW-UP EXAM: ICD-10-CM

## 2024-04-16 DIAGNOSIS — R03.0 ELEVATED BLOOD PRESSURE READING: ICD-10-CM

## 2024-04-16 DIAGNOSIS — M15.9 OSTEOARTHRITIS OF MULTIPLE JOINTS, UNSPECIFIED OSTEOARTHRITIS TYPE: ICD-10-CM

## 2024-04-16 DIAGNOSIS — Z71.2 ENCOUNTER TO DISCUSS TEST RESULTS: Primary | ICD-10-CM

## 2024-04-16 PROCEDURE — 99214 OFFICE O/P EST MOD 30 MIN: CPT | Performed by: INTERNAL MEDICINE

## 2024-04-16 PROCEDURE — 3008F BODY MASS INDEX DOCD: CPT | Performed by: INTERNAL MEDICINE

## 2024-04-16 PROCEDURE — 1036F TOBACCO NON-USER: CPT | Performed by: INTERNAL MEDICINE

## 2024-04-16 RX ORDER — MECLIZINE HYDROCHLORIDE 25 MG/1
25 TABLET ORAL DAILY PRN
COMMUNITY

## 2024-04-16 NOTE — PROGRESS NOTES
Subjective   Patient ID: Paola Morales is a 53 y.o. female who presents for Follow-up (3 month follow-up).    HPI Pt is a pleasant 53 y.o. CF who was seen and evaluated during the FU OV> Pt states that overall she feels good. Pt states that she has the OA of the both knees and right hip. Pt FU with the ortho team an JEN and she was advised be the ortho team to see the rheumatology to address the OA issue. Pt recently had the steroid joints injections, tolerated it well. Pt also had the BW done with [facility]. Pt also had the CT cardiac score test done with the cardiology team.   During the clinical encounter pt denies fever, chills, no SOB, no chest pain/tightness, no abdominal pain, no N/V/D reported, no change of urination reported. Pt denies any other health concerns during this visit.  Sohx: reviewed  PMHx and Fhx: reviewed       Review of Systems  The systems have been reviewed as follows:   Constitutional: no fever, no chills  Eyes: no eyesight problems, no eye redness, no eye pain, no blurred vision  ENT: no ear pain or sore throat, no nasal discharge or congestion, no sinus pressure, no hearing loss  Cardiovascular: no chest pain or tightness, no SOB, the heart rate was not fast or slow  Respiratory: no cough, no dyspnea with exertion or with rest, no wheezing  Gastrointestinal: no abdominal pain, no constipation or diarrhea, no heartburn, no nausea or vomiting  Genitourinary: no urine frequency, no dysuria, no urinary urgency, no urinary incontinence or retention  Musculoskeletal: no back pain, no muscle weakness  Integumentary: no skin lesions or rash  Neurological: no headaches, no dizziness or fainting, no limb weakness  Psychiatric: no mood changes, no anxiety or depression, no SI/HI  Endocrine: no weight change, no temperature intolerance, no change of appetite  Hematologic/Lymphatic: no easy bruising or bleeding  Objective   /81 (BP Location: Left arm, Patient Position: Sitting, BP Cuff Size:  "Large adult)   Pulse 76   Temp 36.7 °C (98.1 °F)   Ht 1.803 m (5' 11\")   Wt 74.4 kg (164 lb)   BMI 22.87 kg/m²     Physical Exam  Constitutional: well hydrated, well nourished, no acute distress. Vital signs reviewed  Head and face: normocephalic, atraumatic  Eyes: no erythema, edema or visible abnormalities of conjunctiva or lids. Pupils are equal, round and reactive to light. Extraocular movement normal  ENT: external ears without deformities  Neck: full ROM, no adenopathy, neck was supple  Pulmonary: normal respiratory rate and effort. Lungs are clear to auscultation, no rales,no rhonchi or wheezing  Cardiovascular: RRR, normal S1 and S2, no murmur, no gallop, posterior tib. pulse normal 2+ bilaterally, no lower extremity edema  Abdomen: soft, non tender on palpation, not distended, normal BS in all 4 quadrants, no CVA tenderness  Musculoskeletal: no joint swelling, normal movements of all 4 extremities. Gait and station: normal, Digits and nails: normal without clubbing  Skin: normal color, no rash  Neurologic: Cranial nerves: CN II: visual acuity intact. Source: acuity reported by patient. Pupils reactive to light with normal accommodation. Right and left pupil normal CN III, IV and VI: the EO movements were intact. CN VIII: no hearing loss. Deep tendon reflexes: were 2+ and symmetric: R and L patella.  Sensory exam: normal light to touch  Psychiatric: Mood and affect: normal, Alert and Oriented x 3  Lymphatic: no cervical lymphadenopathy    Assessment/Plan   Encounter to discuss the test results:  I reviewed, shared and discuss the recent test results with the pt in details. Pt verbalized understandings    Elevated BP readings:  Pt was educated about the healthy BP readings and was instructed to check BP in the regular basis at home and notify the PCP if the BP level will be elevated    OA of the multiple joints: will provide the referral for the rheumatology team evaluation    Pt was instructed to " contact PCP if pt will have no improvement of the condition/worsening of the sxs/new sxs     I discussed every sxs with the pt in detail. Pt verbalized understanding of the health  condition  Please FU with PCP as planned or sooner if needed.

## 2024-06-26 ENCOUNTER — APPOINTMENT (OUTPATIENT)
Dept: RHEUMATOLOGY | Facility: CLINIC | Age: 54
End: 2024-06-26
Payer: COMMERCIAL

## 2024-06-26 ENCOUNTER — HOSPITAL ENCOUNTER (OUTPATIENT)
Dept: RADIOLOGY | Facility: CLINIC | Age: 54
Discharge: HOME | End: 2024-06-26
Payer: COMMERCIAL

## 2024-06-26 ENCOUNTER — LAB (OUTPATIENT)
Dept: LAB | Facility: LAB | Age: 54
End: 2024-06-26
Payer: COMMERCIAL

## 2024-06-26 VITALS
HEART RATE: 77 BPM | HEIGHT: 71 IN | SYSTOLIC BLOOD PRESSURE: 134 MMHG | TEMPERATURE: 98.1 F | WEIGHT: 161 LBS | BODY MASS INDEX: 22.54 KG/M2 | DIASTOLIC BLOOD PRESSURE: 79 MMHG

## 2024-06-26 DIAGNOSIS — M25.50 POLYARTHRALGIA: Primary | ICD-10-CM

## 2024-06-26 DIAGNOSIS — M70.61 GREATER TROCHANTERIC BURSITIS OF RIGHT HIP: ICD-10-CM

## 2024-06-26 DIAGNOSIS — M25.50 POLYARTHRALGIA: ICD-10-CM

## 2024-06-26 DIAGNOSIS — M15.9 OSTEOARTHRITIS OF MULTIPLE JOINTS, UNSPECIFIED OSTEOARTHRITIS TYPE: ICD-10-CM

## 2024-06-26 LAB
25(OH)D3 SERPL-MCNC: 32 NG/ML (ref 30–100)
ALBUMIN SERPL BCP-MCNC: 4.2 G/DL (ref 3.4–5)
ALP SERPL-CCNC: 53 U/L (ref 33–110)
ALT SERPL W P-5'-P-CCNC: 16 U/L (ref 7–45)
ANION GAP SERPL CALC-SCNC: 11 MMOL/L (ref 10–20)
AST SERPL W P-5'-P-CCNC: 19 U/L (ref 9–39)
BASOPHILS # BLD AUTO: 0.04 X10*3/UL (ref 0–0.1)
BASOPHILS NFR BLD AUTO: 0.8 %
BILIRUB SERPL-MCNC: 0.4 MG/DL (ref 0–1.2)
BUN SERPL-MCNC: 13 MG/DL (ref 6–23)
CALCIUM SERPL-MCNC: 9.4 MG/DL (ref 8.6–10.6)
CCP IGG SERPL-ACNC: <1 U/ML
CHLORIDE SERPL-SCNC: 104 MMOL/L (ref 98–107)
CO2 SERPL-SCNC: 28 MMOL/L (ref 21–32)
CREAT SERPL-MCNC: 0.7 MG/DL (ref 0.5–1.05)
CRP SERPL-MCNC: 0.46 MG/DL
EGFRCR SERPLBLD CKD-EPI 2021: >90 ML/MIN/1.73M*2
EOSINOPHIL # BLD AUTO: 0.11 X10*3/UL (ref 0–0.7)
EOSINOPHIL NFR BLD AUTO: 2.2 %
ERYTHROCYTE [DISTWIDTH] IN BLOOD BY AUTOMATED COUNT: 12.3 % (ref 11.5–14.5)
ERYTHROCYTE [SEDIMENTATION RATE] IN BLOOD BY WESTERGREN METHOD: 1 MM/H (ref 0–30)
GLUCOSE SERPL-MCNC: 72 MG/DL (ref 74–99)
HCT VFR BLD AUTO: 40 % (ref 36–46)
HGB BLD-MCNC: 12.8 G/DL (ref 12–16)
IMM GRANULOCYTES # BLD AUTO: 0.02 X10*3/UL (ref 0–0.7)
IMM GRANULOCYTES NFR BLD AUTO: 0.4 % (ref 0–0.9)
LYMPHOCYTES # BLD AUTO: 1.65 X10*3/UL (ref 1.2–4.8)
LYMPHOCYTES NFR BLD AUTO: 32.6 %
MCH RBC QN AUTO: 31.4 PG (ref 26–34)
MCHC RBC AUTO-ENTMCNC: 32 G/DL (ref 32–36)
MCV RBC AUTO: 98 FL (ref 80–100)
MONOCYTES # BLD AUTO: 0.63 X10*3/UL (ref 0.1–1)
MONOCYTES NFR BLD AUTO: 12.5 %
NEUTROPHILS # BLD AUTO: 2.61 X10*3/UL (ref 1.2–7.7)
NEUTROPHILS NFR BLD AUTO: 51.5 %
NRBC BLD-RTO: 0 /100 WBCS (ref 0–0)
PLATELET # BLD AUTO: 269 X10*3/UL (ref 150–450)
POTASSIUM SERPL-SCNC: 4.3 MMOL/L (ref 3.5–5.3)
PROT SERPL-MCNC: 6.6 G/DL (ref 6.4–8.2)
RBC # BLD AUTO: 4.08 X10*6/UL (ref 4–5.2)
RHEUMATOID FACT SER NEPH-ACNC: <10 IU/ML (ref 0–15)
SODIUM SERPL-SCNC: 139 MMOL/L (ref 136–145)
WBC # BLD AUTO: 5.1 X10*3/UL (ref 4.4–11.3)

## 2024-06-26 PROCEDURE — 3008F BODY MASS INDEX DOCD: CPT | Performed by: STUDENT IN AN ORGANIZED HEALTH CARE EDUCATION/TRAINING PROGRAM

## 2024-06-26 PROCEDURE — 81381 HLA I TYPING 1 ALLELE HR: CPT

## 2024-06-26 PROCEDURE — 73120 X-RAY EXAM OF HAND: CPT | Performed by: RADIOLOGY

## 2024-06-26 PROCEDURE — 86140 C-REACTIVE PROTEIN: CPT

## 2024-06-26 PROCEDURE — 85652 RBC SED RATE AUTOMATED: CPT

## 2024-06-26 PROCEDURE — 72202 X-RAY EXAM SI JOINTS 3/> VWS: CPT

## 2024-06-26 PROCEDURE — 72202 X-RAY EXAM SI JOINTS 3/> VWS: CPT | Performed by: RADIOLOGY

## 2024-06-26 PROCEDURE — 82306 VITAMIN D 25 HYDROXY: CPT

## 2024-06-26 PROCEDURE — 73620 X-RAY EXAM OF FOOT: CPT | Performed by: RADIOLOGY

## 2024-06-26 PROCEDURE — 73620 X-RAY EXAM OF FOOT: CPT | Mod: 50

## 2024-06-26 PROCEDURE — 1036F TOBACCO NON-USER: CPT | Performed by: STUDENT IN AN ORGANIZED HEALTH CARE EDUCATION/TRAINING PROGRAM

## 2024-06-26 PROCEDURE — 99204 OFFICE O/P NEW MOD 45 MIN: CPT | Performed by: STUDENT IN AN ORGANIZED HEALTH CARE EDUCATION/TRAINING PROGRAM

## 2024-06-26 PROCEDURE — 85025 COMPLETE CBC W/AUTO DIFF WBC: CPT

## 2024-06-26 PROCEDURE — 80053 COMPREHEN METABOLIC PANEL: CPT

## 2024-06-26 PROCEDURE — 73100 X-RAY EXAM OF WRIST: CPT | Performed by: RADIOLOGY

## 2024-06-26 PROCEDURE — 73120 X-RAY EXAM OF HAND: CPT | Mod: 50

## 2024-06-26 PROCEDURE — 72100 X-RAY EXAM L-S SPINE 2/3 VWS: CPT | Performed by: RADIOLOGY

## 2024-06-26 PROCEDURE — 86431 RHEUMATOID FACTOR QUANT: CPT

## 2024-06-26 PROCEDURE — 73100 X-RAY EXAM OF WRIST: CPT | Mod: 50

## 2024-06-26 PROCEDURE — 72100 X-RAY EXAM L-S SPINE 2/3 VWS: CPT

## 2024-06-26 PROCEDURE — 86038 ANTINUCLEAR ANTIBODIES: CPT

## 2024-06-26 PROCEDURE — 36415 COLL VENOUS BLD VENIPUNCTURE: CPT

## 2024-06-26 PROCEDURE — 86200 CCP ANTIBODY: CPT

## 2024-06-26 NOTE — PROGRESS NOTES
Rheumatology New Outpatient  Note    Subjective   Paola Morales is a 53 y.o. female presenting today for Joint Pain.    History of Presenting Problem:   Paola with PMHx of HTN, increased BMI,  is presenting today as a NP for joint pain      She has chronic pain in lower back, hands, knees and toes. 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 She has back stiffness in the morning.  Her knee pain started many years ago and was told she has OA. Hands and toes have been painful for several months. Pain is worse int he morning.   No joint swelling. She has right hip bursitis.     She has dry eyes.     She denies hair loss, malar rash, photosensitivity, skin rashes, dry mouth, recurrent mouth sores, sudden vision loss, sudden hearing loss, seizures,  inflammatory eye disease, h/o blood clots, cold sensitivity in fingers, vasospastic color changes in fingers when exposed to extreme temperatures and muscle weakness.    She takes ibuprofen/Tylenol which seems to help.         Past Medical History: History reviewed. No pertinent past medical history.    Allergies:   Allergies   Allergen Reactions    Codeine Constipation    Sulfa (Sulfonamide Antibiotics) Unknown       Medications:   Current Outpatient Medications:     biotin 5 mg capsule, Take by mouth., Disp: , Rfl:     glucosamine/chondr dominguez A sod (glucosamine-chondroitin) 1,500-1,200 mg/30 mL liquid, Take by mouth., Disp: , Rfl:     meclizine (Antivert) 25 mg tablet, Take 1 tablet (25 mg) by mouth once daily as needed for dizziness., Disp: , Rfl:     multivitamin tablet, Take 1 tablet by mouth once daily., Disp: , Rfl:     white petrolatum-mineral oiL (Tears Naturale PM) 94-3 % ophthalmic ointment, Apply 1 Application to both eyes., Disp: , Rfl:     Review of Systems:   Constitutional: Denies fever, chills   Eyes: Denies dry eyes, pain in the eyes   ENT: Denies dry mouth, loss of taste, sores in the mouth  Cardiovascular: Denies chest pain,  "palpitations   Respiratory: Denies shortness of breath   Gastrointestinal: Denies heartburn   Integumentary: Denies photosensitivity, rash or lesions, Raynaud's   Neurological: Denies any numbness or tingling    MSK: As per HPI.     All 10 review of systems have been reviewed and are negative for complaint except as noted in the HPI    Objective   Physical Examination:  Vitals:    06/26/24 0839   BP: 134/79   Pulse: 77   Temp: 36.7 °C (98.1 °F)     Growth %ile SmartLinks can only be used for patients less than 20 years old.  Ht Readings from Last 1 Encounters:   06/26/24 1.803 m (5' 11\")     Wt Readings from Last 1 Encounters:   06/26/24 73 kg (161 lb)       General - NAD, sitting up in chair, well-groomed, pleasant, AAOx3  Head: Normocephalic, atraumatic  Eyes - PERRLA, EOMI. No conjunctiva injection.   Cardiovascular - Normal S1, S2. Regular rate and rhythm. No murmurs or rubs.  Lungs - Symmetric chest expansion. Clear to auscultation bilaterally.   Skin - No rashes or ulcers. Skin warm and dry. No erythema on bilateral cheeks.  Extremities - No edema, cyanosis ,or clubbing  Neurological - Alert and oriented x 3,  grossly intact. No focal deficit.  Musculoskeletal -  Shoulders: Full ROM, without pain, no swelling, warmth or tenderness.  Elbows: Full ROM, without pain, no swelling, warmth or tenderness.  Wrists: Full ROM, without pain, no swelling, warmth or tenderness.  MCP: No swelling, warmth or tenderness. Metacarpal squeeze negative  PIP: No swelling, warmth or tenderness.  DIP: No swelling, warmth or tenderness.  Hands : 5/5.    Sacroiliac joints: No local tenderness. CESAR negative.   Hips: Full ROM.  No malalignment.  Knees:  Full ROM, without pain, no swelling, warmth or point tenderness.   Ankles: Full ROM, without pain, swelling, warmth or tenderness.  Toes:  Metatarsal squeeze negative  Cervical spine: No tenderness or limitation of movement  Lumbar spine: No tenderness or limitation of " movement        Laboratory Testin2024: CMP normal, CBC with WBC 14.4 otherwise normal,         Assessment/Plan   Paola Morales is a 53 y.o. female with PMHx of HTN, increased BMI,  who is presenting today as a NP for joint pain:    She has chronic pain and stiffness in her lower back, knees, hands and toes. This is more likely to be polyarticular OA. Patient has some concerns about an autoimmune etiology and so I will check serologies for workup completion. I am also getting new x-rays. Further management will depend on results and will discuss at next visit.       The assessment and plan, risk and benefits were discussed with the patient. All of the patients questions were answered and patient agrees to the plan.      Flo Caballero MD  Clinical   Department of Rheumatology   [facility]

## 2024-06-28 LAB — ANA SER QL HEP2 SUBST: NEGATIVE

## 2024-07-01 LAB — HLAB27 TYPING: NEGATIVE

## 2024-09-26 ENCOUNTER — APPOINTMENT (OUTPATIENT)
Dept: RHEUMATOLOGY | Facility: CLINIC | Age: 54
End: 2024-09-26
Payer: COMMERCIAL

## 2024-09-26 VITALS
HEART RATE: 77 BPM | WEIGHT: 159 LBS | TEMPERATURE: 97.3 F | HEIGHT: 71 IN | DIASTOLIC BLOOD PRESSURE: 81 MMHG | SYSTOLIC BLOOD PRESSURE: 123 MMHG | BODY MASS INDEX: 22.26 KG/M2

## 2024-09-26 DIAGNOSIS — M25.50 POLYARTHRALGIA: ICD-10-CM

## 2024-09-26 DIAGNOSIS — M15.9 OSTEOARTHRITIS OF MULTIPLE JOINTS, UNSPECIFIED OSTEOARTHRITIS TYPE: Primary | ICD-10-CM

## 2024-09-26 DIAGNOSIS — M70.61 GREATER TROCHANTERIC BURSITIS OF RIGHT HIP: ICD-10-CM

## 2024-09-26 PROCEDURE — 1036F TOBACCO NON-USER: CPT | Performed by: STUDENT IN AN ORGANIZED HEALTH CARE EDUCATION/TRAINING PROGRAM

## 2024-09-26 PROCEDURE — 99214 OFFICE O/P EST MOD 30 MIN: CPT | Performed by: STUDENT IN AN ORGANIZED HEALTH CARE EDUCATION/TRAINING PROGRAM

## 2024-09-26 PROCEDURE — 3008F BODY MASS INDEX DOCD: CPT | Performed by: STUDENT IN AN ORGANIZED HEALTH CARE EDUCATION/TRAINING PROGRAM

## 2024-09-26 NOTE — PROGRESS NOTES
Rheumatology Outpatient Follow up Note    Subjective   Paola Morales is a 53 y.o. female presenting today for Joint Pain.    History of Presenting Problem:   Recall:  She has chronic pain in lower back, hands, knees and toes. 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 She has back stiffness in the morning.  Her knee pain started many years ago and was told she has OA. Hands and toes have been painful for several months. Pain is worse int he morning.   No joint swelling. She has right hip bursitis.   RF/CCP/ESR/CRP normal  L spine x-rays shows mild DJD          Past Medical History: No past medical history on file.    Allergies:   Allergies   Allergen Reactions    Codeine Constipation    Sulfa (Sulfonamide Antibiotics) Unknown       Medications:   Current Outpatient Medications:     biotin 5 mg capsule, Take by mouth., Disp: , Rfl:     glucosamine/chondr dominguez A sod (glucosamine-chondroitin) 1,500-1,200 mg/30 mL liquid, Take by mouth., Disp: , Rfl:     meclizine (Antivert) 25 mg tablet, Take 1 tablet (25 mg) by mouth once daily as needed for dizziness., Disp: , Rfl:     multivitamin tablet, Take 1 tablet by mouth once daily., Disp: , Rfl:     white petrolatum-mineral oiL (Tears Naturale PM) 94-3 % ophthalmic ointment, Apply 1 Application to both eyes., Disp: , Rfl:     Review of Systems:   Constitutional: Denies fever, chills   Eyes: Denies dry eyes, pain in the eyes   ENT: Denies dry mouth, loss of taste, sores in the mouth  Cardiovascular: Denies chest pain, palpitations   Respiratory: Denies shortness of breath   Gastrointestinal: Denies heartburn   Integumentary: Denies photosensitivity, rash or lesions, Raynaud's   Neurological: Denies any numbness or tingling    MSK: As per HPI.     All 10 review of systems have been reviewed and are negative for complaint except as noted in the HPI    Objective   Physical Examination:  Vitals:    09/26/24 0921   BP: 123/81   Pulse: 77   Temp: 36.3  "°C (97.3 °F)     Growth %ile SmartLinks can only be used for patients less than 20 years old.  Ht Readings from Last 1 Encounters:   09/26/24 1.803 m (5' 11\")     Wt Readings from Last 1 Encounters:   09/26/24 72.1 kg (159 lb)       General - NAD, sitting up in chair, well-groomed, pleasant, AAOx3  Head: Normocephalic, atraumatic  Eyes - PERRLA, EOMI. No conjunctiva injection.   Lungs - Symmetric chest expansion. Clear to auscultation bilaterally.   Skin - No rashes or ulcers. Skin warm and dry. No erythema on bilateral cheeks.  Extremities - No edema, cyanosis ,or clubbing  Neurological - Alert and oriented x 3,  grossly intact. No focal deficit.          Assessment/Plan   Paola Morales is a 53 y.o. female with PMHx of HTN, increased BMI,  who is presenting today as a follow up for joint pain:    She has chronic pain and stiffness in her lower back, knees, hands and toes. Pain is mechanical likely 2/2 polyarticular OA. Patient had some concerns about an autoimmune etiology and was reassured that serologies and inflammatory markers are negative. L spine x-rays showed mild DJD. She will do home exercises. Her right hip bursitis improved on home exercises and she she declined cortisone injections today and she will RTC PRN.     The assessment and plan, risk and benefits were discussed with the patient. All of the patients questions were answered and patient agrees to the plan.      Flo Caballero MD  Clinical   Department of Rheumatology   [facility]      "

## 2024-10-16 ENCOUNTER — APPOINTMENT (OUTPATIENT)
Dept: PRIMARY CARE | Facility: CLINIC | Age: 54
End: 2024-10-16
Payer: COMMERCIAL

## 2024-10-17 ENCOUNTER — APPOINTMENT (OUTPATIENT)
Dept: PRIMARY CARE | Facility: CLINIC | Age: 54
End: 2024-10-17
Payer: COMMERCIAL

## 2024-10-18 ENCOUNTER — APPOINTMENT (OUTPATIENT)
Dept: PRIMARY CARE | Facility: CLINIC | Age: 54
End: 2024-10-18
Payer: COMMERCIAL

## 2024-10-18 VITALS
HEIGHT: 71 IN | BODY MASS INDEX: 22.12 KG/M2 | DIASTOLIC BLOOD PRESSURE: 78 MMHG | HEART RATE: 80 BPM | WEIGHT: 158 LBS | SYSTOLIC BLOOD PRESSURE: 132 MMHG

## 2024-10-18 DIAGNOSIS — M15.0 PRIMARY OSTEOARTHRITIS INVOLVING MULTIPLE JOINTS: ICD-10-CM

## 2024-10-18 DIAGNOSIS — Z12.31 ENCOUNTER FOR SCREENING MAMMOGRAM FOR MALIGNANT NEOPLASM OF BREAST: ICD-10-CM

## 2024-10-18 DIAGNOSIS — Z00.00 ROUTINE GENERAL MEDICAL EXAMINATION AT A HEALTH CARE FACILITY: ICD-10-CM

## 2024-10-18 DIAGNOSIS — M70.41 PREPATELLAR BURSITIS OF RIGHT KNEE: Primary | ICD-10-CM
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
## 2024-10-18 PROCEDURE — 1036F TOBACCO NON-USER: CPT | Performed by: INTERNAL MEDICINE

## 2024-10-18 PROCEDURE — 99396 PREV VISIT EST AGE 40-64: CPT | Performed by: INTERNAL MEDICINE

## 2024-10-18 PROCEDURE — 3008F BODY MASS INDEX DOCD: CPT | Performed by: INTERNAL MEDICINE

## 2024-10-18 ASSESSMENT — PATIENT HEALTH QUESTIONNAIRE - PHQ9
2. FEELING DOWN, DEPRESSED OR HOPELESS: SEVERAL DAYS
1. LITTLE INTEREST OR PLEASURE IN DOING THINGS: SEVERAL DAYS
10. IF YOU CHECKED OFF ANY PROBLEMS, HOW DIFFICULT HAVE THESE PROBLEMS MADE IT FOR YOU TO DO YOUR WORK, TAKE CARE OF THINGS AT HOME, OR GET ALONG WITH OTHER PEOPLE: SOMEWHAT DIFFICULT
SUM OF ALL RESPONSES TO PHQ9 QUESTIONS 1 AND 2: 2

## 2024-10-18 NOTE — PROGRESS NOTES
Assessment/Plan   Problem List Items Addressed This Visit       Osteoarthritis of multiple joints    Prepatellar bursitis of right knee - Primary    Routine general medical examination at a health care facility     Other Visit Diagnoses       Encounter for screening mammogram for malignant neoplasm of breast        Relevant Orders    BI mammo bilateral screening tomosynthesis        We discussed all the preventive care  Colonoscopy has been done in 2019  Mammogram ordered  Immunization discussed  Labs reviewed  Form filled  For prepatellar bursitis local heating pad and use of over-the-counter diclofenac gel once a day could be good enough and will resolve acute inflammation  She should continue to follow with rheumatologist for any further problems    Subjective   Patient ID: Paola Morales is a 53 y.o. female who presents for Follow-up (Patient has concerns of high blood pressure.  States that has a bump on her right knee that she would like to have looked at.  States that she had a fall on Sept 3rd and hit her knee and never got a bruise or any pain but now she has a bump on it.  ).    Past Surgical History:   Procedure Laterality Date    CYST REMOVAL  10/11/2024      No family history on file.   Social History     Socioeconomic History    Marital status: Single     Spouse name: Not on file    Number of children: Not on file    Years of education: Not on file    Highest education level: Not on file   Occupational History    Not on file   Tobacco Use    Smoking status: Never    Smokeless tobacco: Never   Substance and Sexual Activity    Alcohol use: Yes     Alcohol/week: 2.0 standard drinks of alcohol     Types: 2 Standard drinks or equivalent per week    Drug use: Not Currently    Sexual activity: Not on file   Other Topics Concern    Not on file   Social History Narrative    Not on file     Social Drivers of Health     Financial Resource Strain: Not on file   Food Insecurity: Not on file   Transportation Needs:  "Not on file   Physical Activity: Not on file   Stress: Not on file   Social Connections: Not on file   Intimate Partner Violence: Not on file   Housing Stability: Not on file      Codeine and Sulfa (sulfonamide antibiotics)   Current Outpatient Medications   Medication Sig Dispense Refill    biotin 5 mg capsule Take by mouth.      glucosamine/chondr dominguez A sod (glucosamine-chondroitin) 1,500-1,200 mg/30 mL liquid Take by mouth.      meclizine (Antivert) 25 mg tablet Take 1 tablet (25 mg) by mouth once daily as needed for dizziness.      multivitamin tablet Take 1 tablet by mouth once daily.      white petrolatum-mineral oiL (Tears Naturale PM) 94-3 % ophthalmic ointment Apply 1 Application to both eyes.       No current facility-administered medications for this visit.      Vitals:    10/18/24 0855 10/18/24 0901   BP: 145/84 132/78   BP Location: Left arm Left arm   Patient Position: Sitting Sitting   Pulse: 80    Weight: 71.7 kg (158 lb)    Height: 1.803 m (5' 11\")       Problem List Items Addressed This Visit       Osteoarthritis of multiple joints    Prepatellar bursitis of right knee - Primary    Routine general medical examination at a health care facility     Other Visit Diagnoses       Encounter for screening mammogram for malignant neoplasm of breast        Relevant Orders    BI mammo bilateral screening tomosynthesis           Orders Placed This Encounter   Procedures    BI mammo bilateral screening tomosynthesis     Standing Status:   Future     Standing Expiration Date:   12/18/2025     Order Specific Question:   Perform a breast ultrasound if clinically indicated by Radiologist?     Answer:   Yes     Order Specific Question:   Is the patient pregnant?     Answer:   No     Order Specific Question:   Reason for exam:     Answer:   Breast Cancer Screening     Order Specific Question:   Radiologist to Determine Optimal Study     Answer:   Yes     Order Specific Question:   Release result to [facility]     Answer: " "  Immediate     Order Specific Question:   Is this exam part of a Research Study? If Yes, link this order to the research study     Answer:   No        HPI\  This was a preventive care visit  She had all the lab results and wanted the form to sign  She had mammogram last year but not this year yet  Her visit to OB was delayed  She also had a fall in September and had some discomfort and swelling in the patellar area as result of the fall  She is not sure then whether she lost consciousness  She has seen rheumatologist for multiple joint issues and was diagnosed as polyarthritis osteoarthritis including the knee    ROS otherwise negative  Past medical history reviewed  Social and family history reviewed  Allergies and medications reviewed  Recent labs reviewed  Vital signs reviewed    PHYSICAL EXAM  As far as the right knee is concerned evidence of DJD and evidence of little inflammation in the prepatellar area consistent with mild prepatellar bursitis  Cardiovascular chest abdominal neurological examination normal      No results found for: \"PR1\", \"BMPR1A\", \"CMPLAS\", \"BI2OCJQQ\", \"KPSAT\"   No results found for: \"CHOL\", \"LDLCALC\", \"HDLC\", \"LCTRG\", \"CHHDL\"             "

## 2024-11-07 ENCOUNTER — APPOINTMENT (OUTPATIENT)
Dept: OBSTETRICS AND GYNECOLOGY | Facility: CLINIC | Age: 54
End: 2024-11-07
Payer: COMMERCIAL

## 2024-12-31 ENCOUNTER — TELEPHONE (OUTPATIENT)
Dept: PRIMARY CARE | Facility: CLINIC | Age: 54
End: 2024-12-31

## 2024-12-31 NOTE — TELEPHONE ENCOUNTER
----- Message from Dana Carolina sent at 12/30/2024  2:18 PM EST -----  Please advise patient that her mammogram is negative.  They do report that she has dense breasts which can make mammograms less sensitive.  She could consider additional testing with breast MRI which may be covered depending on her family history.  She is interested in this, would recommend she discuss this either with her gynecologist or at follow-up with Dr. Ayala.

## 2024-12-31 NOTE — TELEPHONE ENCOUNTER
----- Message from Tamela Mota sent at 12/30/2024  4:17 PM EST -----  Your mammogram is negative for any abnormal masses or lesions.  Recommend repeating imaging in 1 year.    Your mammogram identifies that you have dense breast tissue which could hide abnormalities and decrease the sensitivity of mammogram for breast cancer screening.  Dense breast tissue, in and of itself, is a relatively common condition. Therefore, this information is not provided to cause undue concern.  Rather, it is to raise your awareness and promote discussion regarding the presence of dense breast tissue in addition to other risk factors  You may benefit from additional screening with Fast Breast MRI.   [facility] now offers a low-cost, self-pay, 10-minute breast MRI study with contrast. Even though MRI is the most sensitive tool for the detection of breast cancer, it does not replace screening mammogram/tomosynthesis.  If you are interested in pursuing this imaging, please visit: [email or web address] or discuss with me at your next office visit.  Insurance will likely not cover this testing, please check with your plan for coverage.

## 2025-01-06 ENCOUNTER — APPOINTMENT (OUTPATIENT)
Dept: OBSTETRICS AND GYNECOLOGY | Facility: CLINIC | Age: 55
End: 2025-01-06
Payer: COMMERCIAL

## 2025-01-06 VITALS
HEIGHT: 71 IN | BODY MASS INDEX: 23.27 KG/M2 | SYSTOLIC BLOOD PRESSURE: 166 MMHG | WEIGHT: 166.2 LBS | DIASTOLIC BLOOD PRESSURE: 89 MMHG

## 2025-01-06 DIAGNOSIS — Z01.419 WOMEN'S ANNUAL ROUTINE GYNECOLOGICAL EXAMINATION: Primary | ICD-10-CM

## 2025-01-06 DIAGNOSIS — Z12.31 VISIT FOR SCREENING MAMMOGRAM: ICD-10-CM

## 2025-01-06 DIAGNOSIS — R92.30 DENSE BREAST: ICD-10-CM

## 2025-01-06 DIAGNOSIS — R92.2 DENSE BREAST: ICD-10-CM

## 2025-01-06 PROCEDURE — 99396 PREV VISIT EST AGE 40-64: CPT | Performed by: OBSTETRICS & GYNECOLOGY

## 2025-01-06 PROCEDURE — 3008F BODY MASS INDEX DOCD: CPT | Performed by: OBSTETRICS & GYNECOLOGY

## 2025-01-06 PROCEDURE — 87624 HPV HI-RISK TYP POOLED RSLT: CPT

## 2025-01-06 NOTE — PROGRESS NOTES
Subjective   Patient ID: Paola Morales is a 54 y.o. female who presents for Annual Exam.    Last pap: 10/30/23 normal HPV-  Last mamm: 12/16/24 normal, ordered for this year  LMP: absent  Contraception: menopause  Sexually active: yes  Self breast exam: yes  Diet: balanced  Exercise: yes    HPI  Pt presents for annual exam.  Pt stopped the birth control.  LMP was Aug when she stopped the birth control pills.  Having hot flashes and night sweats.      Review of Systems  all other symptoms were found to be neg except for HPI/CC.      Objective   Physical Exam  Chest:   Breasts:     Right: Normal.      Left: Normal.       Constitutional:       Appearance: Normal appearance.   HENT:      Head: Normocephalic and atraumatic.      Nose: Nose normal.   Cardiovascular:      Rate and Rhythm: Normal rate and regular rhythm.   Pulmonary:      Effort: Pulmonary effort is normal.   Abdominal:      Palpations: Abdomen is soft.   Genitourinary:     General: Normal vulva.      Exam position: Lithotomy position.      Anjel stage (genital): 5.      Labia:         Right: No rash, tenderness, lesion or injury.         Left: No rash, tenderness, lesion or injury.       Urethra: No prolapse, urethral pain, urethral swelling or urethral lesion.      Vagina: Normal.      Cervix: Normal.      Uterus: Normal.       Adnexa: Right adnexa normal and left adnexa normal.        Right: No mass, tenderness or fullness.          Left: No mass, tenderness or fullness.     Musculoskeletal:         General: Normal range of motion.      Cervical back: Normal range of motion.   Skin:     General: Skin is warm and dry.   Neurological:      General: No focal deficit present.      Mental Status: She is alert.   Psychiatric:         Mood and Affect: Mood normal.         Behavior: Behavior normal.      Assessment/Plan   Diagnoses and all orders for this visit:  Women's annual routine gynecological examination  Visit for screening mammogram     Pt is to F/U  in 1 yr for annual exam or PRN.  Pt is to call with any questions of concerns.  Talked about hot flashes and treatment options, gave info.  Mamm done, dense breasts, ordered MRI  Pap done    Oneyda Sorensen CMA 01/06/25 9:07 AM

## 2025-01-15 ENCOUNTER — TELEPHONE (OUTPATIENT)
Dept: OBSTETRICS AND GYNECOLOGY | Facility: CLINIC | Age: 55
End: 2025-01-15
Payer: COMMERCIAL

## 2025-01-15 NOTE — TELEPHONE ENCOUNTER
----- Message from Yvonne Smiley sent at 1/14/2025  2:06 PM EST -----  Pap negative  Okay to call and let know

## 2026-01-15 ENCOUNTER — APPOINTMENT (OUTPATIENT)
Dept: OBSTETRICS AND GYNECOLOGY | Facility: CLINIC | Age: 56
End: 2026-01-15
Payer: COMMERCIAL